# Patient Record
Sex: FEMALE | Race: BLACK OR AFRICAN AMERICAN | NOT HISPANIC OR LATINO | URBAN - METROPOLITAN AREA
[De-identification: names, ages, dates, MRNs, and addresses within clinical notes are randomized per-mention and may not be internally consistent; named-entity substitution may affect disease eponyms.]

---

## 2017-01-17 ENCOUNTER — OFFICE VISIT - HEALTHEAST (OUTPATIENT)
Dept: INTERNAL MEDICINE | Facility: CLINIC | Age: 37
End: 2017-01-17

## 2017-01-17 DIAGNOSIS — R42 DIZZINESS: ICD-10-CM

## 2017-01-17 DIAGNOSIS — N92.6 ABNORMAL MENSTRUAL PERIODS: ICD-10-CM

## 2017-01-17 DIAGNOSIS — R30.0 BURNING WITH URINATION: ICD-10-CM

## 2017-01-17 DIAGNOSIS — Z00.00 HEALTH MAINTENANCE EXAMINATION: ICD-10-CM

## 2017-01-17 ASSESSMENT — MIFFLIN-ST. JEOR: SCORE: 1236.74

## 2018-04-20 ENCOUNTER — OFFICE VISIT - HEALTHEAST (OUTPATIENT)
Dept: INTERNAL MEDICINE | Facility: CLINIC | Age: 38
End: 2018-04-20

## 2018-04-20 DIAGNOSIS — N92.6 IRREGULAR PERIODS: ICD-10-CM

## 2018-04-20 DIAGNOSIS — E55.9 VITAMIN D DEFICIENCY: ICD-10-CM

## 2018-04-20 DIAGNOSIS — N89.8 VAGINAL DISCHARGE: ICD-10-CM

## 2018-04-20 DIAGNOSIS — R53.83 FATIGUE: ICD-10-CM

## 2018-04-20 DIAGNOSIS — N89.8 VAGINAL ITCHING: ICD-10-CM

## 2018-04-20 LAB
ANION GAP SERPL CALCULATED.3IONS-SCNC: 10 MMOL/L (ref 5–18)
BUN SERPL-MCNC: 7 MG/DL (ref 8–22)
CALCIUM SERPL-MCNC: 9.2 MG/DL (ref 8.5–10.5)
CHLORIDE BLD-SCNC: 107 MMOL/L (ref 98–107)
CLUE CELLS: NORMAL
CO2 SERPL-SCNC: 24 MMOL/L (ref 22–31)
CREAT SERPL-MCNC: 0.59 MG/DL (ref 0.6–1.1)
ERYTHROCYTE [DISTWIDTH] IN BLOOD BY AUTOMATED COUNT: 13.2 % (ref 11–14.5)
FSH SERPL-ACNC: 59.7 MIU/ML
GFR SERPL CREATININE-BSD FRML MDRD: >60 ML/MIN/1.73M2
GLUCOSE BLD-MCNC: 143 MG/DL (ref 70–125)
HCG UR QL: NEGATIVE
HCT VFR BLD AUTO: 37.2 % (ref 35–47)
HGB BLD-MCNC: 12.4 G/DL (ref 12–16)
LH SERPL-ACNC: 39.9 MIU/ML
MCH RBC QN AUTO: 27.5 PG (ref 27–34)
MCHC RBC AUTO-ENTMCNC: 33.5 G/DL (ref 32–36)
MCV RBC AUTO: 82 FL (ref 80–100)
PLATELET # BLD AUTO: 280 THOU/UL (ref 140–440)
PMV BLD AUTO: 7.2 FL (ref 7–10)
POTASSIUM BLD-SCNC: 3.5 MMOL/L (ref 3.5–5)
RBC # BLD AUTO: 4.52 MILL/UL (ref 3.8–5.4)
SODIUM SERPL-SCNC: 141 MMOL/L (ref 136–145)
SP GR UR STRIP: 1.01 (ref 1–1.03)
TRICHOMONAS, WET PREP: NORMAL
TSH SERPL DL<=0.005 MIU/L-ACNC: 0.59 UIU/ML (ref 0.3–5)
WBC: 5.1 THOU/UL (ref 4–11)
YEAST, WET PREP: NORMAL

## 2018-04-23 ENCOUNTER — COMMUNICATION - HEALTHEAST (OUTPATIENT)
Dept: INTERNAL MEDICINE | Facility: CLINIC | Age: 38
End: 2018-04-23

## 2018-04-23 DIAGNOSIS — R73.09 ABNORMAL GLUCOSE: ICD-10-CM

## 2018-04-23 LAB
25(OH)D3 SERPL-MCNC: 30.2 NG/ML (ref 30–80)
25(OH)D3 SERPL-MCNC: 30.2 NG/ML (ref 30–80)

## 2018-05-16 ENCOUNTER — OFFICE VISIT - HEALTHEAST (OUTPATIENT)
Dept: INTERNAL MEDICINE | Facility: CLINIC | Age: 38
End: 2018-05-16

## 2018-05-16 DIAGNOSIS — Z91.09 ENVIRONMENTAL ALLERGIES: ICD-10-CM

## 2018-07-05 ENCOUNTER — COMMUNICATION - HEALTHEAST (OUTPATIENT)
Dept: INTERNAL MEDICINE | Facility: CLINIC | Age: 38
End: 2018-07-05

## 2018-10-05 ENCOUNTER — OFFICE VISIT - HEALTHEAST (OUTPATIENT)
Dept: MIDWIFE SERVICES | Facility: CLINIC | Age: 38
End: 2018-10-05

## 2018-10-05 DIAGNOSIS — N89.8 VAGINAL ITCHING: ICD-10-CM

## 2018-10-05 LAB
CLUE CELLS: NORMAL
TRICHOMONAS, WET PREP: NORMAL
YEAST, WET PREP: NORMAL

## 2018-10-05 ASSESSMENT — MIFFLIN-ST. JEOR: SCORE: 1241.27

## 2018-11-16 ENCOUNTER — OFFICE VISIT - HEALTHEAST (OUTPATIENT)
Dept: INTERNAL MEDICINE | Facility: CLINIC | Age: 38
End: 2018-11-16

## 2018-11-16 DIAGNOSIS — G43.009 MIGRAINE WITHOUT AURA AND WITHOUT STATUS MIGRAINOSUS, NOT INTRACTABLE: ICD-10-CM

## 2018-11-16 DIAGNOSIS — Z00.00 HEALTH MAINTENANCE EXAMINATION: ICD-10-CM

## 2018-11-16 DIAGNOSIS — R73.9 HYPERGLYCEMIA: ICD-10-CM

## 2018-11-16 LAB
ANION GAP SERPL CALCULATED.3IONS-SCNC: 9 MMOL/L (ref 5–18)
BUN SERPL-MCNC: 9 MG/DL (ref 8–22)
CALCIUM SERPL-MCNC: 9.9 MG/DL (ref 8.5–10.5)
CHLORIDE BLD-SCNC: 106 MMOL/L (ref 98–107)
CHOLEST SERPL-MCNC: 199 MG/DL
CO2 SERPL-SCNC: 26 MMOL/L (ref 22–31)
CREAT SERPL-MCNC: 0.58 MG/DL (ref 0.6–1.1)
FASTING STATUS PATIENT QL REPORTED: NO
GFR SERPL CREATININE-BSD FRML MDRD: >60 ML/MIN/1.73M2
GLUCOSE BLD-MCNC: 95 MG/DL (ref 70–125)
HBA1C MFR BLD: 5.5 % (ref 3.5–6)
HDLC SERPL-MCNC: 72 MG/DL
LDLC SERPL CALC-MCNC: 111 MG/DL
POTASSIUM BLD-SCNC: 4 MMOL/L (ref 3.5–5)
SODIUM SERPL-SCNC: 141 MMOL/L (ref 136–145)
TRIGL SERPL-MCNC: 78 MG/DL

## 2018-11-16 ASSESSMENT — MIFFLIN-ST. JEOR: SCORE: 1249.21

## 2018-12-07 ENCOUNTER — OFFICE VISIT - HEALTHEAST (OUTPATIENT)
Dept: MIDWIFE SERVICES | Facility: CLINIC | Age: 38
End: 2018-12-07

## 2018-12-07 DIAGNOSIS — Z12.4 ENCOUNTER FOR PAPANICOLAOU SMEAR OF CERVIX: ICD-10-CM

## 2018-12-07 ASSESSMENT — MIFFLIN-ST. JEOR: SCORE: 1249.21

## 2018-12-10 LAB
HPV SOURCE: ABNORMAL
HUMAN PAPILLOMA VIRUS 16 DNA: POSITIVE
HUMAN PAPILLOMA VIRUS 18 DNA: NEGATIVE
HUMAN PAPILLOMA VIRUS FINAL DIAGNOSIS: ABNORMAL
HUMAN PAPILLOMA VIRUS OTHER HR: POSITIVE
SPECIMEN DESCRIPTION: ABNORMAL

## 2018-12-21 ENCOUNTER — COMMUNICATION - HEALTHEAST (OUTPATIENT)
Dept: OBGYN | Facility: HOSPITAL | Age: 38
End: 2018-12-21

## 2018-12-21 LAB
BKR LAB AP ABNORMAL BLEEDING: NO
BKR LAB AP BIRTH CONTROL/HORMONES: ABNORMAL
BKR LAB AP CERVICAL APPEARANCE: NORMAL
BKR LAB AP GYN ADEQUACY: ABNORMAL
BKR LAB AP GYN INTERPRETATION: ABNORMAL
BKR LAB AP GYN OTHER FINDINGS: ABNORMAL
BKR LAB AP HPV REFLEX: ABNORMAL
BKR LAB AP LMP: ABNORMAL
BKR LAB AP PATIENT STATUS: ABNORMAL
BKR LAB AP PREVIOUS ABNORMAL: ABNORMAL
BKR LAB AP PREVIOUS NORMAL: 2012
HIGH RISK?: YES
INTERPRETING LAB: ABNORMAL
PATH REPORT.COMMENTS IMP SPEC: ABNORMAL
RESULT FLAG (HE HISTORICAL CONVERSION): ABNORMAL

## 2018-12-24 ENCOUNTER — AMBULATORY - HEALTHEAST (OUTPATIENT)
Dept: OBGYN | Facility: CLINIC | Age: 38
End: 2018-12-24

## 2018-12-24 DIAGNOSIS — R87.610 ATYPICAL SQUAMOUS CELLS OF UNDETERMINED SIGNIFICANCE ON CYTOLOGIC SMEAR OF CERVIX (ASC-US): ICD-10-CM

## 2018-12-26 ENCOUNTER — COMMUNICATION - HEALTHEAST (OUTPATIENT)
Dept: OBGYN | Facility: CLINIC | Age: 38
End: 2018-12-26

## 2018-12-26 DIAGNOSIS — R87.610 ASCUS WITH POSITIVE HIGH RISK HPV CERVICAL: ICD-10-CM

## 2018-12-26 DIAGNOSIS — R87.810 ASCUS WITH POSITIVE HIGH RISK HPV CERVICAL: ICD-10-CM

## 2021-05-28 ENCOUNTER — RECORDS - HEALTHEAST (OUTPATIENT)
Dept: ADMINISTRATIVE | Facility: CLINIC | Age: 41
End: 2021-05-28

## 2021-05-29 ENCOUNTER — HEALTH MAINTENANCE LETTER (OUTPATIENT)
Age: 41
End: 2021-05-29

## 2021-05-30 VITALS — BODY MASS INDEX: 25.21 KG/M2 | HEIGHT: 62 IN | WEIGHT: 137 LBS

## 2021-06-01 VITALS — BODY MASS INDEX: 25.4 KG/M2 | HEIGHT: 62 IN | WEIGHT: 138 LBS

## 2021-06-01 VITALS — BODY MASS INDEX: 24.91 KG/M2 | WEIGHT: 134 LBS

## 2021-06-01 VITALS — WEIGHT: 131 LBS | BODY MASS INDEX: 24.35 KG/M2

## 2021-06-02 VITALS — BODY MASS INDEX: 25.4 KG/M2 | HEIGHT: 62 IN | WEIGHT: 138 LBS

## 2021-06-02 VITALS — HEIGHT: 62 IN | BODY MASS INDEX: 25.4 KG/M2 | WEIGHT: 138 LBS

## 2021-06-08 NOTE — PROGRESS NOTES
ASSESSMENT:  1. Dizziness  Comfort Sung is a 37-year-old woman who presents for ongoing abnormal periods as well as dizziness.  The dizziness sounds somewhat reminiscent of vertigo, but has resolved after taking vitamin D supplement.  If this returns, would consider referral to PT/OT for vestibular rehab.  We will check hemoglobin since this was not checked last time as well as iron level.  Initial reading of hemoglobin was found to be normal.  - HM2(CBC w/o Differential)  - Iron and Transferrin Iron Binding Capacity    2. Burning with urination  - Pregnancy, Urine    3. Abnormal menstrual periods  She had a normal progestin withdrawal test after taking 10 days of medroxyprogesterone.  She was seen by OB/GYN in the past, and failed to follow-up.  She will follow-up an OB/GYN in the area.  Defer workup to them.  Urine pregnancy test negative.  - Pregnancy, Urine        PLAN:  Patient Instructions   Appointment for OB GYN or Midwife coming up (Dr. Bret Bob)    Check labs today for red blood cell and iron test    Tetanus shot    Due for pap smear?           Orders Placed This Encounter   Procedures     Tdap vaccine,  8yo or older,  IM     HM2(CBC w/o Differential)     Iron and Transferrin Iron Binding Capacity     Medications Discontinued During This Encounter   Medication Reason     MULTIVITAMIN ORAL Error       No Follow-up on file.    CHIEF COMPLAINT:  Chief Complaint   Patient presents with     Dizziness     pt would like to have her iron check     Abdominal Pain     x 6 days     Menstrual Problem     pt hasn't had her period for 2 months     Immunizations     pt is due for her Tdap       HISTORY OF PRESENT ILLNESS:  Comfort is a 37 y.o. female presenting to the clinic today with complaints of dizziness. Until recently, she had been feeling dizzy, describing the room spinning or that she is spinning around. The dizziness usually occurs when she is sitting or supine. She notes that since starting vitamin D  "1000 units daily she has been less dizzy. She is amenable to an OT referral if dizziness returns.     Immunizations: She is due for a tetanus immunization.     Menorrhea: She states that for the past 2 months she has no had her period. She describes regular menstrual cramping and feels as though she has had a period at the regular interval, though there is no menstrual bleeding. She has had intervals of amenorrhea in the past as well, with periods occurring twice per month on several occasions and going up to 3 months without signs of a period.   of She saw Dr. Omer at [place] about 2 years ago who gave her a trial of Provera and she had a normal menstruation at the end of the course. She was instructed to follow up from there but things fell through and she never had a follow up scheduled. She recalls that she had a PAP at [facility], though the most recent record is not available.     REVIEW OF SYSTEMS:   She denies any abdominal pain.   All other systems are negative.    PFSH:  Reviewed, as above.     TOBACCO USE:  History   Smoking Status     Never Smoker   Smokeless Tobacco     Not on file       VITALS:  Vitals:    01/17/17 1155   BP: 102/70   Patient Site: Left Arm   Patient Position: Sitting   Cuff Size: Adult Regular   Pulse: 80   Resp: 16   Weight: 137 lb (62.1 kg)   Height: 5' 1.5\" (1.562 m)     Wt Readings from Last 3 Encounters:   01/17/17 137 lb (62.1 kg)   11/04/16 139 lb 1.6 oz (63.1 kg)       PHYSICAL EXAM:  General: Alert, pleasant, no distress. Appears well.     ADDITIONAL HISTORY SUMMARIZED (2): Notes from Dr. Omer from 12/2015 [facility] reviewed regarding menstruation.   DECISION TO OBTAIN EXTRA INFORMATION (1): Care everywhere accessed.   RADIOLOGY TESTS (1): None.  LABS (1): Labs ordered.  MEDICINE TESTS (1): None.  INDEPENDENT REVIEW (2 each): None.     QUALITY MEASURES:  The following high BMI interventions were performed this visit: encouragement to exercise     The visit lasted a " total of 17 minutes face to face with the patient. Over 50% of the time was spent counseling and educating the patient about dizziness.    I, Jaya Pfeiffer, am scribing for and in the presence of, Dr. Raymond.    I, Dr. Raymond, personally performed the services described in this documentation, as scribed by Jaya Pfeiffer in my presence, and it is both accurate and complete.    MEDICATIONS:  Current Outpatient Prescriptions   Medication Sig Dispense Refill     cholecalciferol, vitamin D3, 1,000 unit tablet Take 1,000 Units by mouth daily.       No current facility-administered medications for this visit.        Total data points: 4    Jaya Raymond DO  Internal Medicine  [facility]

## 2021-06-17 NOTE — PROGRESS NOTES
[facility] Clinic Follow Up Note    Assessment/Plan:      1. Vaginal itching  Itchiness is more on the external genital area then vaginally.  Wet prep was negative for yeast infection but I think she can still have mild Candida.  Gave her prescription for topical cream.    - Wet Prep, Vaginal  - miconazole (MICONAZOLE 7) 2 % vaginal cream; Apply twice a day for 7 days  Dispense: 45 g; Refill: 0    2. Fatigue  Chronic, will check metabolic panel  - Basic Metabolic Panel  - Thyroid Stimulating Hormone (TSH)  - HM2(CBC w/o Differential)    3. Irregular periods  Patient does have mild hot flashes.  She is too young for menopause but could have premature ovarian failure.  Will check hormonal levels and a pregnancy test.  Also recommended that she sees a gynecologist to see if Provera prescription might be appropriate.  - Thyroid Stimulating Hormone (TSH)  - Follicle Stimulating Hormone (FSH)  - Luteinizing Hormone (LH)  - Pregnancy (Beta-hCG, Qual), Urine  - Ambulatory referral to Gynecology    4. Vitamin D deficiency  She is on a vitamin D supplement, will check levels  - Vitamin D, Total (25-Hydroxy)      Rosi Salas MD    Chief Complaint:  Chief Complaint   Patient presents with     Vaginal Itching     X a week of vagina itchy, odor denied discharge       History of Present Illness:  Comfort is a 38 y.o. female with history of vitamin D deficiency who is currently here for acute visit due to vaginal itching.    Patient reports external genital itching but no vaginal discharge or pain.  She is  and sexually active but does not use birth control.  Most recently she has had more irregular periods and often would skip a month.  Currently she reports that she has not had a period since the end of January.  She has several children.  She reports having Pap smear done 1 or 2 years ago and eat being normal.  I do not have the results.    She also complains about fatigue which is chronic.  She is on vitamin  D supplement but not on prenatal vitamin.  Her menstruations are not excessive.    Review of Systems:  A comprehensive review of systems was performed and was otherwise negative    PFSH:  Social History: Reviewed  History   Smoking Status     Never Smoker   Smokeless Tobacco     Never Used     Social History     Social History Narrative    She is  to Addison Carey, also a patient of [facility]. She works as a personal care assistant.        Past History: Reviewed  Current Outpatient Prescriptions   Medication Sig Dispense Refill     cholecalciferol, vitamin D3, 1,000 unit tablet Take 1,000 Units by mouth daily.       miconazole (MICONAZOLE 7) 2 % vaginal cream Apply twice a day for 7 days 45 g 0     No current facility-administered medications for this visit.        Family History: Reviewed    Physical Exam:    Vitals:    04/20/18 1043   BP: 100/60   Patient Site: Left Arm   Patient Position: Sitting   Cuff Size: Adult Regular   Pulse: 72   Weight: 134 lb (60.8 kg)     Wt Readings from Last 3 Encounters:   04/20/18 134 lb (60.8 kg)   01/17/17 137 lb (62.1 kg)   11/04/16 139 lb 1.6 oz (63.1 kg)     Body mass index is 24.91 kg/(m^2).    Constitutional:  Reveals a pleasant young female.  Vitals:  Per nursing notes.  HEENT:No cervical LAD, no thyromegaly,  conjunctiva is pink, no scleral icterus, TMs are visualized and normal bl, oropharynx is clear, no exudates,   Cardiac:  Regular rate and rhythm,no murmurs, rubs, or gallops.Legs without edema. Palpation of the radial pulse regular.  Lungs: Clear to auscultation bl.  Respiratory effort normal.  Abdomen:positive BS, soft, nontender, nondistended.  No hepato-splenomagaly  Skin:   Without rash, bruise, or palpable lesions.  External genital exam shows mild irritation at the introitus but no intertrigo.  Psychiatric: affect appropriate, memory intact.     Data Review:    Analysis and Summary of Old Records (2): Yes    Records Requested (1): No    Other History  Summarized (from other people in the room) (2): No    Radiology Tests Summarized (XRAY/CT/MRI/DXA) (1): No    Labs Reviewed (1): Yes    Medicine Tests Reviewed (EKG/ECHO/COLONOSCOPY/EGD) (1): No    Independent Review of EKG or X-RAY (2): No

## 2021-06-18 NOTE — PROGRESS NOTES
[facility] Clinic Note    Comfort Sung   38 y.o. female    Date of Visit: 5/16/2018  Chief Complaint   Patient presents with     Allergies     X 4 days of severe itchy on eye, nos eand throat       ASSESSMENT/PLAN  1. Environmental allergies  fluticasone (FLONASE) 50 mcg/actuation nasal spray    azelastine (OPTIVAR) 0.05 % ophthalmic solution     ---------------------------------------------    Typical allergic conjunctivitis and rhinitis.  Claritin not helping her.  Flonase and azelastine eye drops ordered. Can try Claritin as well.      Return if symptoms worsen or fail to improve.      SUBJECTIVE  Comfort Sung is a 38 year old woman here for the above symptoms.  For 4 days she has had itchy/runny eyes, nasal drainage and congestion, intermittent throat irritation (mostly at night), sneezing, etc.  Her children have allergies.  She thinks she has a cold.      She lost 3 lbs since last visit and is concerned.  Looking back as far as I can, weight has remained within 10 lbs.        Medications, allergies, and problem list were reviewed and updated    Patient Active Problem List   Diagnosis     Secondary physiologic amenorrhea     Vitamin D deficiency     Environmental allergies     No past medical history on file.  Current Outpatient Prescriptions   Medication Sig Dispense Refill     cholecalciferol, vitamin D3, 1,000 unit tablet Take 1,000 Units by mouth daily.       azelastine (OPTIVAR) 0.05 % ophthalmic solution Administer 1 drop to both eyes 2 (two) times a day. 6 mL 12     fluticasone (FLONASE) 50 mcg/actuation nasal spray 2 sprays into each nostril daily. 16 g 12     No current facility-administered medications for this visit.      No Known Allergies    EXAM  Vitals:    05/16/18 1100   BP: 118/76   Patient Site: Left Arm   Patient Position: Sitting   Cuff Size: Adult Regular   Pulse: 86   SpO2: 99%   Weight: 131 lb (59.4 kg)         GEN: alert, no distress  ENT: Tm's normal.  No conjunctivitis  or inj of sclerae  No sinus tenderness    Results reviewed:   Rev last note by Dr. Salas and normal labs (including neg hCG)         Jaya Raymond DO  Internal Medicine  [facility]

## 2021-06-20 NOTE — PROGRESS NOTES
Assessment:     Vaginitis     Plan:     -Wet prep collected and negative.  -Treatment: Discussed use of Monistat externally to help relieve symptoms, if patient desires  -Education done regarding vaginal hygeine, probiotics, warm tub baths, avoiding douches, only cotton underwear, and avoiding pantiliners.   -RTC if symptoms persist or worsen.   -Declined flu vaccine today  -Patient has appointment scheduled for GYN exam and to discuss birth control     TT with patient 30 min >50% time spent in counseling or coordination of care.     Subjective:       Comfort Sung is a 38 y.o. female who presents for evaluation of an abnormal vaginal and vulvar itching. Symptoms have been present for 14 days. Vaginal symptoms: discharge described as white and vulvar itching. Contraception: none. She denies burning and odor Sexually transmitted infection risk: very low risk of STD exposure. Menstrual flow: irregular.  LMP 9-18-18.      The following portions of the patient's history were reviewed and updated as appropriate: allergies, current medications, past family history, past medical history, past social history, past surgical history and problem list.      Review of Systems  A 12 point comprehensive review of systems was negative except as noted.      Current Outpatient Prescriptions   Medication Sig     azelastine (OPTIVAR) 0.05 % ophthalmic solution Administer 1 drop to both eyes 2 (two) times a day.     cholecalciferol, vitamin D3, 1,000 unit tablet Take 1,000 Units by mouth daily.     fluticasone (FLONASE) 50 mcg/actuation nasal spray 2 sprays into each nostril daily.       No past medical history on file.    No family history on file.    OB History    Para Term  AB Living   4 4 4   4   SAB TAB Ectopic Multiple Live Births       4      # Outcome Date GA Lbr Jimmy/2nd Weight Sex Delivery Anes PTL Lv   4 Term 12 39w0d   F CS-Classical   ANTONIO   3 Term 09 40w0d   F CS-Classical   ANTONIO   2 Term  10/26/05 41w0d   M    ANTONIO   1 Term 04/15/02 42w0d   F CS-Classical   ANTONIO            Objective:     Physical Exam:  General: Pleasant, articulate, well-groomed, well-nourished female.  Not in any apparent distress.  Skin: no lesions or rashes.  Abdomen: Soft, nontender, no masses  Pelvic:  External genitalia: Normal hair distribution, no lesions.  Urethral opening: Without lesions, or tenderness.   Bladder: Without masses, or tenderness.  Vagina: Pink, rugated, normal-appearing discharge. Odor noted, consistent with BV.  Wet prep obtained.  Cervix: multiparous, pink, smooth, no lesions.    Bimanual: Uterus small, mobile, nontender, no masses.  No CMT.  Adnexa, without masses or tenderness.    Lower extremities: +1 reflexes, no significant edema.

## 2021-06-21 NOTE — PROGRESS NOTES
[facility]    Comfort Sung   38 y.o. female    Date of Visit: 11/16/2018  Chief Complaint   Patient presents with     Annual Exam     pt not fasting, no pap       ASSESSMENT/PLAN  1. Health maintenance examination  Lipid Cascade    Basic Metabolic Panel   2. Migraine without aura and without status migrainosus, not intractable  SUMAtriptan (IMITREX) 50 MG tablet   3. Hyperglycemia  Glycosylated Hemoglobin A1c     ---------------------------------------------    1.  Fasting physical today, she is in good shape, but I would recommend getting back to exercise.  BMI is just on the upper limit of normal.  Aside from milk in her coffee today, she is fasting, check labs.    2.  She has history of migraine, she mentions that she had a normal MRI at one point in time.  She is not on any migraine prophylactic or abortive agent.  She wanted a prescription for this, sumatriptan sent in.  I carefully explained that she cannot take this when she is pregnant.  She should take it at the onset of the headache, should not use it for tension type headache.  Also, she cannot take this medication frequently.    3.  She had an elevated blood sugar in the past, check A1c today.    Return in about 1 year (around 11/16/2019) for Annual physical.      SUBJECTIVE  Comfort Sung is a 38-year-old woman who presents for annual physical.    She had her last Pap smear in 2015 through [facility].  She plans to follow-up with a nurse midwife team for the pelvic exam, as she is more comfortable with a female provider.    We discussed breast cancer screening starts in the 40s, explained the procedure, she probably does not have to initiate this until 45, but she can have the discussion about it at age 40 to make a decision.  She denies having any breast lumps or masses.    She has not been exercising lately, but plans to get back to that.  She has been busy as of late, has a membership at [employer].  He works as a  PCA, used to work at [employer], she recently got a job through [employer] working at the [employer].    She has 4 children with Addison Carey.    She had a concern about migraines, but they are not new.  I could not pinpoint exactly when they started, but she just describes pain on one side of the head, photophobia, phonophobia, nausea.  They do not happen very often.  She has tried Tylenol in the past, going to sleep.  Sometimes she has to leave work.    She is looking for prescription options, also asked about over-the-counter strategies for management of migraine.  Not getting enough sleep is a trigger for migraine, also seemingly not is also a trigger.    ROS A comprehensive review of systems was performed and was otherwise negative    Medications, allergies, and problem list were reviewed and updated    Patient Active Problem List   Diagnosis     Secondary physiologic amenorrhea     Vitamin D deficiency     Environmental allergies     Migraine without aura and without status migrainosus, not intractable     Past Medical History:   Diagnosis Date     No known problems      Past Surgical History:   Procedure Laterality Date      SECTION, CLASSIC       Social History     Socioeconomic History     Marital status: Single     Spouse name: Not on file     Number of children: 4     Years of education: Not on file     Highest education level: Not on file   Social Needs     Financial resource strain: Not on file     Food insecurity - worry: Not on file     Food insecurity - inability: Not on file     Transportation needs - medical: Not on file     Transportation needs - non-medical: Not on file   Occupational History     Not on file   Tobacco Use     Smoking status: Never Smoker     Smokeless tobacco: Never Used   Substance and Sexual Activity     Alcohol use: Yes     Comment: rare     Drug use: No     Sexual activity: Yes     Partners: Male   Other Topics Concern     Not on file   Social History Narrative    She is   "to Addison Carey, also a patient of [facility]. She works as a personal care assistant.      Family History   Problem Relation Age of Onset     No Medical Problems Mother      No Medical Problems Father        Current Outpatient Medications   Medication Sig Dispense Refill     azelastine (OPTIVAR) 0.05 % ophthalmic solution Administer 1 drop to both eyes 2 (two) times a day. 6 mL 12     cholecalciferol, vitamin D3, 1,000 unit tablet Take 1,000 Units by mouth daily.       fluticasone (FLONASE) 50 mcg/actuation nasal spray 2 sprays into each nostril daily. 16 g 12     SUMAtriptan (IMITREX) 50 MG tablet Take 1 at onset of migraine headache, can repeat once in 2 hours if headache not gone. 20 tablet 2     No current facility-administered medications for this visit.        No Known Allergies    EXAM  Vitals:    11/16/18 1153   BP: 102/60   Patient Site: Left Arm   Patient Position: Sitting   Cuff Size: Adult Regular   Pulse: 82   Temp: 98.5  F (36.9  C)   TempSrc: Tympanic   SpO2: 97%   Weight: 138 lb (62.6 kg)   Height: 5' 2\" (1.575 m)         General: alert, no distress  HEENT: sclerae anicteric, moist oral mucosa  Heart: Regular rate and rhythm, no murmurs.  No pretibial edema.  Warm extremities  Lungs: Clear to auscultation bilat  Gastrointestinal: abdomen is  non-distended.    Skin: warm/dry, no rashes  Neuro: no gross abnormalities  Breast/gyn: Deferred    Reviewed \[facility] [place] [facility]\"" clinic note from 2015, she had a negative Pap at that time.    Jaya Raymond, DO  Internal Medicine  [facility]  "

## 2021-06-22 NOTE — PROGRESS NOTES
"  Assessment:      Screening pap smear.     Normal breast exam    Plan:      Pap smear with HPV today   Discussed use of prenatal vitamin for preconception   RTO in 1 year   Call with questions/concerns    TT with patient 40 min, >50% spent on counseling and coordination of care     Subjective:       Comfort Sung is a 38 y.o. woman who comes in today for a  pap smear, breast exam and pelvic exam. Her most recent annual exam was on 11-16-18. Her most recent Pap smear was on 7-13-15 and showed ASCUS with NEGATIVE high risk HPV. Previous abnormal Pap smears: yes. Denies any colposcopy or LEEP in the past. Contraception: none, considering pregnancy    The following portions of the patient's history were reviewed and updated as appropriate: allergies, current medications, problem list, past family history, past medical history, past social history and past surgical history    Review of Systems  General:  Denies problem  Eyes: Denies problem  Ears/Nose/Throat: Denies problem  Cardiovascular: Denies problem  Respiratory:  Denies problem  Gastrointestinal:  Denies problem, Genitourinary: Denies problem  Musculoskeletal:  Denies problem  Skin: Denies problem  Neurologic: Denies problem  Psychiatric: Denies problem  Endocrine: Denies problem  Heme/Lymphatic: Denies problem   Allergic/Immunologic: Denies problem      Objective:        Vitals:    12/07/18 1049   BP: (!) 88/60   Pulse: 76   Weight: 138 lb (62.6 kg)   Height: 5' 2\" (1.575 m)       Physical Exam:  General Appearance: Alert, cooperative, no distress, appears stated age  Head: Normocephalic, without obvious abnormality, atraumatic  Eyes:Conjunctiva/corneas clear, EOM's intact  Ears: Normal external ear canals, both ears  Nose: Nares normal, septum midline,mucosa normal, no drainage  Throat: Lips, mucosa, and tongue normal; teeth and gums normal  Lungs: respirations unlabored  Breasts: No breast masses, tenderness, asymmetry, or nipple discharge.  Abdomen: Soft, " non-tender, no masses, no organomegaly  Pelvic:Normally developed genitalia with no external lesions or eruptions. Vagina and cervix show no lesions, inflammation, discharge or tenderness. No cystocele, No rectocele. Uterus anteverted.  No adnexal mass or tenderness.  Extremities: Extremities normal, atraumatic, no cyanosis or edema  Skin: Skin color, texture, turgor normal, no rashes or lesions  Neurologic: Normal

## 2021-09-18 ENCOUNTER — HEALTH MAINTENANCE LETTER (OUTPATIENT)
Age: 41
End: 2021-09-18

## 2022-06-25 ENCOUNTER — HEALTH MAINTENANCE LETTER (OUTPATIENT)
Age: 42
End: 2022-06-25

## 2022-11-20 ENCOUNTER — HEALTH MAINTENANCE LETTER (OUTPATIENT)
Age: 42
End: 2022-11-20

## 2023-07-08 ENCOUNTER — HEALTH MAINTENANCE LETTER (OUTPATIENT)
Age: 43
End: 2023-07-08

## 2024-02-03 ENCOUNTER — HEALTH MAINTENANCE LETTER (OUTPATIENT)
Age: 44
End: 2024-02-03